# Patient Record
Sex: MALE | Race: WHITE | HISPANIC OR LATINO | Employment: OTHER | URBAN - METROPOLITAN AREA
[De-identification: names, ages, dates, MRNs, and addresses within clinical notes are randomized per-mention and may not be internally consistent; named-entity substitution may affect disease eponyms.]

---

## 2023-03-23 ENCOUNTER — IMAGING SERVICES (OUTPATIENT)
Dept: GENERAL RADIOLOGY | Age: 33
End: 2023-03-23
Attending: FAMILY MEDICINE

## 2023-03-23 ENCOUNTER — WALK IN (OUTPATIENT)
Dept: URGENT CARE | Age: 33
End: 2023-03-23

## 2023-03-23 VITALS
OXYGEN SATURATION: 98 % | DIASTOLIC BLOOD PRESSURE: 78 MMHG | HEIGHT: 66 IN | BODY MASS INDEX: 35.57 KG/M2 | SYSTOLIC BLOOD PRESSURE: 124 MMHG | WEIGHT: 221.34 LBS | RESPIRATION RATE: 16 BRPM | HEART RATE: 80 BPM | TEMPERATURE: 98.1 F

## 2023-03-23 DIAGNOSIS — M25.512 ACUTE PAIN OF LEFT SHOULDER: ICD-10-CM
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
## 2023-03-23 DIAGNOSIS — M25.532 LEFT WRIST PAIN: ICD-10-CM

## 2023-03-23 DIAGNOSIS — S46.812A STRAIN OF LEFT TRAPEZIUS MUSCLE, INITIAL ENCOUNTER: ICD-10-CM

## 2023-03-23 PROCEDURE — 73000 X-RAY EXAM OF COLLAR BONE: CPT | Performed by: RADIOLOGY

## 2023-03-23 PROCEDURE — 99204 OFFICE O/P NEW MOD 45 MIN: CPT | Performed by: FAMILY MEDICINE

## 2023-03-23 PROCEDURE — 73110 X-RAY EXAM OF WRIST: CPT | Performed by: RADIOLOGY

## 2023-03-23 PROCEDURE — 73030 X-RAY EXAM OF SHOULDER: CPT | Performed by: RADIOLOGY

## 2023-03-23 RX ORDER — CYCLOBENZAPRINE HCL 10 MG
10 TABLET ORAL
Qty: 15 TABLET | Refills: 0 | Status: SHIPPED | OUTPATIENT
Start: 2023-03-23

## 2023-03-23 RX ORDER — HYDROCODONE BITARTRATE AND ACETAMINOPHEN 5; 325 MG/1; MG/1
1 TABLET ORAL EVERY 6 HOURS PRN
Qty: 15 TABLET | Refills: 0 | Status: SHIPPED | OUTPATIENT
Start: 2023-03-23

## 2023-03-23 RX ORDER — IBUPROFEN 200 MG
600 TABLET ORAL ONCE
Status: COMPLETED | OUTPATIENT
Start: 2023-03-23 | End: 2023-03-23

## 2023-03-23 RX ORDER — IBUPROFEN 600 MG/1
600 TABLET ORAL EVERY 8 HOURS PRN
Qty: 30 TABLET | Refills: 0 | Status: SHIPPED | OUTPATIENT
Start: 2023-03-23

## 2023-03-23 RX ADMIN — Medication 600 MG: at 15:36

## 2024-07-16 ENCOUNTER — HOSPITAL ENCOUNTER (EMERGENCY)
Facility: MEDICAL CENTER | Age: 34
End: 2024-07-17
Attending: STUDENT IN AN ORGANIZED HEALTH CARE EDUCATION/TRAINING PROGRAM
Payer: MEDICAID

## 2024-07-16 DIAGNOSIS — J45.21 MILD INTERMITTENT ASTHMA WITH ACUTE EXACERBATION: ICD-10-CM

## 2024-07-16 DIAGNOSIS — I10 PRIMARY HYPERTENSION: ICD-10-CM

## 2024-07-16 PROCEDURE — 99283 EMERGENCY DEPT VISIT LOW MDM: CPT

## 2024-07-16 RX ORDER — PREDNISONE 20 MG/1
60 TABLET ORAL ONCE
Status: COMPLETED | OUTPATIENT
Start: 2024-07-17 | End: 2024-07-17

## 2024-07-17 ENCOUNTER — PHARMACY VISIT (OUTPATIENT)
Dept: PHARMACY | Facility: MEDICAL CENTER | Age: 34
End: 2024-07-17
Payer: COMMERCIAL

## 2024-07-17 VITALS
DIASTOLIC BLOOD PRESSURE: 89 MMHG | HEIGHT: 66 IN | RESPIRATION RATE: 32 BRPM | BODY MASS INDEX: 35.61 KG/M2 | SYSTOLIC BLOOD PRESSURE: 146 MMHG | HEART RATE: 81 BPM | TEMPERATURE: 96.5 F | OXYGEN SATURATION: 99 % | WEIGHT: 221.56 LBS

## 2024-07-17 PROCEDURE — 94644 CONT INHLJ TX 1ST HOUR: CPT

## 2024-07-17 PROCEDURE — 700101 HCHG RX REV CODE 250: Performed by: STUDENT IN AN ORGANIZED HEALTH CARE EDUCATION/TRAINING PROGRAM

## 2024-07-17 PROCEDURE — 700111 HCHG RX REV CODE 636 W/ 250 OVERRIDE (IP): Performed by: STUDENT IN AN ORGANIZED HEALTH CARE EDUCATION/TRAINING PROGRAM

## 2024-07-17 PROCEDURE — RXMED WILLOW AMBULATORY MEDICATION CHARGE: Performed by: STUDENT IN AN ORGANIZED HEALTH CARE EDUCATION/TRAINING PROGRAM

## 2024-07-17 RX ORDER — PREDNISONE 20 MG/1
40 TABLET ORAL DAILY
Qty: 4 TABLET | Refills: 0 | Status: SHIPPED | OUTPATIENT
Start: 2024-07-17

## 2024-07-17 RX ORDER — ALBUTEROL SULFATE 90 UG/1
2 AEROSOL, METERED RESPIRATORY (INHALATION) EVERY 6 HOURS PRN
Qty: 18 G | Refills: 3 | Status: SHIPPED | OUTPATIENT
Start: 2024-07-17

## 2024-07-17 RX ORDER — ALBUTEROL SULFATE 90 UG/1
2 AEROSOL, METERED RESPIRATORY (INHALATION) EVERY 6 HOURS PRN
Qty: 8.5 G | Refills: 0 | Status: SHIPPED | OUTPATIENT
Start: 2024-07-17 | End: 2024-07-17

## 2024-07-17 RX ADMIN — PREDNISONE 60 MG: 20 TABLET ORAL at 00:04

## 2024-07-17 RX ADMIN — IPRATROPIUM BROMIDE 0.5 MG: 0.5 SOLUTION RESPIRATORY (INHALATION) at 00:24

## 2024-07-17 RX ADMIN — Medication 15 MG/HR: at 00:24

## 2024-08-16 ENCOUNTER — HOSPITAL ENCOUNTER (EMERGENCY)
Facility: MEDICAL CENTER | Age: 34
End: 2024-08-16
Payer: MEDICAID

## 2024-08-16 VITALS
WEIGHT: 226.41 LBS | OXYGEN SATURATION: 95 % | HEIGHT: 66 IN | RESPIRATION RATE: 18 BRPM | HEART RATE: 67 BPM | BODY MASS INDEX: 36.39 KG/M2 | DIASTOLIC BLOOD PRESSURE: 104 MMHG | TEMPERATURE: 97.1 F | SYSTOLIC BLOOD PRESSURE: 148 MMHG

## 2024-08-16 LAB — EKG IMPRESSION: NORMAL

## 2024-08-16 PROCEDURE — 302449 STATCHG TRIAGE ONLY (STATISTIC)

## 2024-08-16 PROCEDURE — 93005 ELECTROCARDIOGRAM TRACING: CPT

## 2024-08-16 NOTE — ED TRIAGE NOTES
Chief Complaint   Patient presents with    Chest Pain     With radiation to L neck, L arm pit, L arm. Reports also dizziness and SOB.      Physical Exam  Pulmonary:      Effort: Pulmonary effort is normal.   Skin:     General: Skin is warm and dry.   Neurological:      Mental Status: He is alert.

## 2024-12-16 ENCOUNTER — HOSPITAL ENCOUNTER (EMERGENCY)
Facility: MEDICAL CENTER | Age: 34
End: 2024-12-16
Attending: EMERGENCY MEDICINE
Payer: MEDICAID

## 2024-12-16 VITALS
WEIGHT: 221 LBS | OXYGEN SATURATION: 97 % | HEART RATE: 68 BPM | SYSTOLIC BLOOD PRESSURE: 129 MMHG | BODY MASS INDEX: 35.52 KG/M2 | HEIGHT: 66 IN | TEMPERATURE: 97.7 F | RESPIRATION RATE: 18 BRPM | DIASTOLIC BLOOD PRESSURE: 90 MMHG

## 2024-12-16 DIAGNOSIS — J45.31 MILD PERSISTENT ASTHMA WITH ACUTE EXACERBATION: ICD-10-CM

## 2024-12-16 DIAGNOSIS — Z76.0 MEDICATION REFILL: ICD-10-CM

## 2024-12-16 LAB — EKG IMPRESSION: NORMAL

## 2024-12-16 PROCEDURE — 700111 HCHG RX REV CODE 636 W/ 250 OVERRIDE (IP): Performed by: EMERGENCY MEDICINE

## 2024-12-16 PROCEDURE — 99284 EMERGENCY DEPT VISIT MOD MDM: CPT

## 2024-12-16 PROCEDURE — 93005 ELECTROCARDIOGRAM TRACING: CPT | Mod: TC

## 2024-12-16 PROCEDURE — A9270 NON-COVERED ITEM OR SERVICE: HCPCS | Performed by: EMERGENCY MEDICINE

## 2024-12-16 PROCEDURE — 93005 ELECTROCARDIOGRAM TRACING: CPT | Mod: TC | Performed by: EMERGENCY MEDICINE

## 2024-12-16 PROCEDURE — 700102 HCHG RX REV CODE 250 W/ 637 OVERRIDE(OP): Performed by: EMERGENCY MEDICINE

## 2024-12-16 RX ORDER — ALBUTEROL SULFATE 90 UG/1
2 INHALANT RESPIRATORY (INHALATION) ONCE
Status: COMPLETED | OUTPATIENT
Start: 2024-12-16 | End: 2024-12-16

## 2024-12-16 RX ORDER — ALBUTEROL SULFATE 90 UG/1
2 INHALANT RESPIRATORY (INHALATION) EVERY 4 HOURS PRN
Qty: 1 EACH | Refills: 1 | Status: SHIPPED | OUTPATIENT
Start: 2024-12-16

## 2024-12-16 RX ORDER — PREDNISONE 20 MG/1
60 TABLET ORAL DAILY
Qty: 12 TABLET | Refills: 0 | Status: SHIPPED | OUTPATIENT
Start: 2024-12-16 | End: 2024-12-20

## 2024-12-16 RX ADMIN — ALBUTEROL SULFATE 2 PUFF: 90 AEROSOL, METERED RESPIRATORY (INHALATION) at 14:07

## 2024-12-16 RX ADMIN — PREDNISONE 60 MG: 50 TABLET ORAL at 14:15

## 2024-12-16 NOTE — ED TRIAGE NOTES
"Chief Complaint   Patient presents with    Asthma     PT presents d/t asthma exacerbation x 3 days. PT notes he has been out of his Proair inhaler.      BP (!) 145/90   Pulse 83   Temp 36.5 °C (97.7 °F) (Temporal)   Resp 20   Ht 1.676 m (5' 6\")   Wt 100 kg (221 lb)   SpO2 93%   BMI 35.67 kg/m²     "

## 2024-12-16 NOTE — ED PROVIDER NOTES
"ED Provider Note    CHIEF COMPLAINT  Chief Complaint   Patient presents with    Asthma     PT presents d/t asthma exacerbation x 3 days. PT notes he has been out of his Proair inhaler.          HPI/ROS  LIMITATION TO HISTORY   Select: : None  OUTSIDE HISTORIAN(S):  Family patient's significant other at bedside for discussion history and symptoms    José Miguel Mishra is a 34 y.o. male who presents with complaint of difficulty breathing, dry cough.  Activity worsens his shortness of breath.  He ran out of his albuterol 3 days ago.  He borrowed a friend's Xopenex inhaler, developed a small rash patch, pruritic, to his anterior chest.  He denies oral swelling.  No fever or chills, no chest pain.  Patient believes he is allergic to his friend's inhaler.    PAST MEDICAL HISTORY   has a past medical history of Asthma.    SURGICAL HISTORY  patient denies any surgical history    FAMILY HISTORY  No family history on file.    SOCIAL HISTORY  Social History     Tobacco Use    Smoking status: Never    Smokeless tobacco: Never   Vaping Use    Vaping status: Never Used   Substance and Sexual Activity    Alcohol use: Never    Drug use: Never    Sexual activity: Not on file       CURRENT MEDICATIONS  Home Medications       Reviewed by Dickson Lenz R.N. (Registered Nurse) on 12/16/24 at 1348  Med List Status: Not Addressed     Medication Last Dose Status   albuterol 108 (90 Base) MCG/ACT Aero Soln inhalation aerosol  Active   predniSONE (DELTASONE) 20 MG Tab  Active                    ALLERGIES  No Known Allergies    PHYSICAL EXAM  VITAL SIGNS: BP (!) 145/90   Pulse 83   Temp 36.5 °C (97.7 °F) (Temporal)   Resp 20   Ht 1.676 m (5' 6\")   Wt 100 kg (221 lb)   SpO2 93%   BMI 35.67 kg/m²    Constitutional: Well-nourished, no acute distress  Respiratory: Slight diminished breath sounds throughout, there is mild expiratory wheezing, no crackles  Cardiac: Regular rate, regular rhythm  Skin: Erythematous patch of rash anterior " chest, no vesicles or pustules, no scaling or eschar.  ENT: No facial lesions or swelling, no intraoral lesions  Eyes: No conjunctivitis, pupils are equal  GI: Abdomen is nontender          COURSE & MEDICAL DECISION MAKING    ASSESSMENT, COURSE AND PLAN  Care Narrative: Patient presents out of his albuterol.  Exam is consistent with a mild asthma exacerbation, his pulse oximetry is normal at 98% on room air while I was in the room.  Rash to the chest is of unknown etiology, possible allergy to his friends medicine, possible contact dermatitis or viral exanthem.  There is no evidence of vesicular or pustular areas.  No angioedema of the face or mouth.  Patient is stable for discharge.  He is treated with 2 puffs of albuterol in the ER, prescribed refill albuterol inhaler.  Patient be treated with an additional 4 days of oral prednisone.  Patient is well-appearing with normal pulse oximetry, further workup was not indicated          DISPOSITION AND DISCUSSIONS    Escalation of care considered, and ultimately not performed:Laboratory analysis and diagnostic imaging    Barriers to care at this time, including but not limited to: Patient does not have established PCP.     Decision tools and prescription drugs considered including, but not limited to: Antibiotics were not indicated, no evidence of bacterial infection .    FINAL DIAGNOSIS  1. Mild persistent asthma with acute exacerbation    2. Medication refill         Electronically signed by: Franklin Meeks M.D., 12/16/2024 1:58 PM